# Patient Record
Sex: FEMALE | Race: WHITE | URBAN - METROPOLITAN AREA
[De-identification: names, ages, dates, MRNs, and addresses within clinical notes are randomized per-mention and may not be internally consistent; named-entity substitution may affect disease eponyms.]

---

## 2018-05-17 ENCOUNTER — OFFICE VISIT (OUTPATIENT)
Dept: FAMILY MEDICINE | Facility: CLINIC | Age: 18
End: 2018-05-17
Payer: COMMERCIAL

## 2018-05-17 VITALS
WEIGHT: 107.2 LBS | TEMPERATURE: 98.1 F | DIASTOLIC BLOOD PRESSURE: 73 MMHG | HEART RATE: 87 BPM | BODY MASS INDEX: 19.73 KG/M2 | SYSTOLIC BLOOD PRESSURE: 110 MMHG | HEIGHT: 62 IN

## 2018-05-17 DIAGNOSIS — S09.92XA INJURY OF NOSE, INITIAL ENCOUNTER: Primary | ICD-10-CM

## 2018-05-17 DIAGNOSIS — R09.81 NASAL CONGESTION: ICD-10-CM

## 2018-05-17 PROCEDURE — 99203 OFFICE O/P NEW LOW 30 MIN: CPT | Performed by: PHYSICIAN ASSISTANT

## 2018-05-17 RX ORDER — EPINEPHRINE 0.3 MG/.3ML
0.3 INJECTION SUBCUTANEOUS PRN
COMMUNITY

## 2018-05-17 NOTE — PROGRESS NOTES
"  SUBJECTIVE:   Nae Mason is a 17 year old female who presents to clinic today for the following health issues:      Pt was hit with a baseball x1 week ago. Pt reported that her nose is still sore and tender, she feels congested, and she said its hard to breathe out of her nose at times. OTC Tylenol for pain. Pt is wondering if her nose is broken or not.       Was hit with a thrown baseball on Thursday last week. Her nose bled for about 10 minutes after the injury. She had some slight bruising after. No black eyes. Slight swelling. She took some tylenol the next day. That did help. Has pain only when rubbing her nose at the end of it. No pain on the nasal bone or face.   No blurred vision.   She is congested right now. She is still able to smell. Has history of seasonal allergies.   No recurrent nose bleeds.   No pain when chewing.       Problem list and histories reviewed & adjusted, as indicated.  Additional history: as documented    There is no problem list on file for this patient.    Past Surgical History:   Procedure Laterality Date     ENT SURGERY      PE tubes     ORTHOPEDIC SURGERY      left elbow- left arm fracture       Social History   Substance Use Topics     Smoking status: Never Smoker     Smokeless tobacco: Never Used     Alcohol use No     Family History   Problem Relation Age of Onset     Hypertension Mother      Other Cancer Maternal Grandfather      blood cancer           Reviewed and updated as needed this visit by clinical staff  Tobacco  Allergies  Meds  Problems  Med Hx  Surg Hx  Fam Hx  Soc Hx        Reviewed and updated as needed this visit by Provider  Allergies  Meds  Problems         ROS:  Constitutional, HEENT, cardiovascular, pulmonary, gi and gu systems are negative, except as otherwise noted.    OBJECTIVE:     /73 (BP Location: Right arm, Patient Position: Chair, Cuff Size: Adult Regular)  Pulse 87  Temp 98.1  F (36.7  C) (Oral)  Ht 5' 1.81\" (1.57 m)  Wt " 107 lb 3.2 oz (48.6 kg)  LMP 05/03/2018  Breastfeeding? No  BMI 19.73 kg/m2  Body mass index is 19.73 kg/(m^2).  GENERAL: healthy, alert and no distress  EYES: Eyes grossly normal to inspection, PERRL and conjunctivae and sclerae normal, extra occular movements intact   HENT: ear canals and TM's normal, nose with mild nasal turbinate swelling on the left. No pain with palpation to the nasal bones or the orbits. Nasal bone is straight and no step off appreciated. No pain with palpation of the zygomatic arches. No bruising or swelling seen of the nose. Mouth without ulcers or lesions  NECK: no adenopathy, no asymmetry, masses, or scars and thyroid normal to palpation  SKIN: no suspicious lesions or rashes  NEURO: Normal strength and tone, mentation intact and speech normal, CN II-XII intact.     Diagnostic Test Results:  none     ASSESSMENT/PLAN:       ICD-10-CM    1. Injury of nose, initial encounter S09.92XA    2. Nasal congestion R09.81    Discussed symptoms and indications for x-ray. Offered x-ray but at this time unlikely to . Patient declined. Can try over the counter antihistamine as needed for nasal congestion. return to clinic if new symptoms.     FUTURE APPOINTMENTS:       - Follow-up for annual visit or as needed    Eleni Vickers PA-C  [facility]

## 2018-05-17 NOTE — MR AVS SNAPSHOT
"              After Visit Summary   2018    Nae Mason    MRN: [id number]           Patient Information     Date Of Birth          2000        Visit Information        Provider Department      2018 11:00 AM Eleni Vickers PA-C [facility]         Follow-ups after your visit        Who to contact     If you have questions or need follow up information about today's clinic visit or your schedule please contact [facility] directly at [phone].  Normal or non-critical lab and imaging results will be communicated to you by MyChart, letter or phone within 4 business days after the clinic has received the results. If you do not hear from us within 7 days, please contact the clinic through THE[facility]art or phone. If you have a critical or abnormal lab result, we will notify you by phone as soon as possible.  Submit refill requests through amprice or call your pharmacy and they will forward the refill request to us. Please allow 3 business days for your refill to be completed.          Additional Information About Your Visit        [facility] Information     amprice lets you send messages to your doctor, view your test results, renew your prescriptions, schedule appointments and more. To sign up, go to [email or web address] . Click on \"Log in\" on the left side of the screen, which will take you to the Welcome page. Then click on \"Sign up Now\" on the right side of the page.     You will be asked to enter the access code listed below, as well as some personal information. Please follow the directions to create your username and password.     Your access code is: [email or web address]  Expires: 8/15/2018 11:08 AM     Your access code will  in 90 days. If you need help or a new code, please call your [facility] or [phone].        Care EveryWhere ID     This is your Care EveryWhere ID. This could be used by other organizations to access your [place] medical " "records  [id number]        Your Vitals Were     Pulse Temperature Height Last Period Breastfeeding? BMI (Body Mass Index)    87 98.1  F (36.7  C) (Oral) 5' 1.81\" (1.57 m) 05/03/2018 No 19.73 kg/m2       Blood Pressure from Last 3 Encounters:   05/17/18 110/73    Weight from Last 3 Encounters:   05/17/18 107 lb 3.2 oz (48.6 kg) (11 %)*     * Growth percentiles are based on CDC 2-20 Years data.              Today, you had the following     No orders found for display       Primary Care Provider Fax #    Physician No Ref-Primary [phone]       No address on file        Equal Access to Services     TAHIR PRETTY : Cy Dumont, jose ryan, stephanie levy, katarina cervantes . So [facility] [phone].    ATENCIÓN: Si habla español, tiene a lui disposición servicios gratuitos de asistencia lingüística. Llame al [phone].    We comply with applicable federal civil rights laws and [place] laws. We do not discriminate on the basis of race, color, national origin, age, disability, sex, sexual orientation, or gender identity.            Thank you!     Thank you for choosing [facility]  for your care. Our goal is always to provide you with excellent care. Hearing back from our patients is one way we can continue to improve our services. Please take a few minutes to complete the written survey that you may receive in the mail after your visit with us. Thank you!             Your Updated Medication List - Protect others around you: Learn how to safely use, store and throw away your medicines at www.disposemymeds.org.          This list is accurate as of 5/17/18 11:09 AM.  Always use your most recent med list.                   Brand Name Dispense Instructions for use Diagnosis    EPINEPHrine 0.3 MG/0.3ML injection 2-pack    EPIPEN/ADRENACLICK/or ANY BX GENERIC EQUIV     Inject 0.3 mg into the muscle as needed for anaphylaxis        TYLENOL PO     "